# Patient Record
Sex: FEMALE | Race: WHITE
[De-identification: names, ages, dates, MRNs, and addresses within clinical notes are randomized per-mention and may not be internally consistent; named-entity substitution may affect disease eponyms.]

---

## 2022-07-18 ENCOUNTER — HOSPITAL ENCOUNTER (EMERGENCY)
Dept: HOSPITAL 75 - ER FS | Age: 62
Discharge: HOME | End: 2022-07-18
Payer: MEDICAID

## 2022-07-18 VITALS — SYSTOLIC BLOOD PRESSURE: 133 MMHG | DIASTOLIC BLOOD PRESSURE: 81 MMHG

## 2022-07-18 DIAGNOSIS — Z87.448: ICD-10-CM

## 2022-07-18 DIAGNOSIS — Z28.310: ICD-10-CM

## 2022-07-18 DIAGNOSIS — F41.9: Primary | ICD-10-CM

## 2022-07-18 DIAGNOSIS — Z79.2: ICD-10-CM

## 2022-07-18 LAB
ALBUMIN SERPL-MCNC: 4.5 GM/DL (ref 3.2–4.5)
ALP SERPL-CCNC: 67 U/L (ref 40–136)
ALT SERPL-CCNC: 15 U/L (ref 0–55)
APTT BLD: 29 SEC (ref 24–35)
BASOPHILS # BLD AUTO: 0.1 10^3/UL (ref 0–0.1)
BASOPHILS NFR BLD AUTO: 1 % (ref 0–10)
BILIRUB SERPL-MCNC: 0.2 MG/DL (ref 0.1–1)
BUN/CREAT SERPL: 10
CALCIUM SERPL-MCNC: 9.2 MG/DL (ref 8.5–10.1)
CHLORIDE SERPL-SCNC: 104 MMOL/L (ref 98–107)
CO2 SERPL-SCNC: 23 MMOL/L (ref 21–32)
CREAT SERPL-MCNC: 1.06 MG/DL (ref 0.6–1.3)
D DIMER PPP FEU-MCNC: 0.35 UG/ML (ref 0–0.49)
EOSINOPHIL # BLD AUTO: 0.1 10^3/UL (ref 0–0.3)
EOSINOPHIL NFR BLD AUTO: 2 % (ref 0–10)
GFR SERPLBLD BASED ON 1.73 SQ M-ARVRAT: 60 ML/MIN
GLUCOSE SERPL-MCNC: 103 MG/DL (ref 70–105)
HCT VFR BLD CALC: 39 % (ref 35–52)
HGB BLD-MCNC: 13.1 G/DL (ref 11.5–16)
INR PPP: 0.9 (ref 0.8–1.4)
LYMPHOCYTES # BLD AUTO: 4 10^3/UL (ref 1–4)
LYMPHOCYTES NFR BLD AUTO: 53 % (ref 12–44)
MAGNESIUM SERPL-MCNC: 2 MG/DL (ref 1.6–2.4)
MANUAL DIFFERENTIAL PERFORMED BLD QL: NO
MCH RBC QN AUTO: 30 PG (ref 25–34)
MCHC RBC AUTO-ENTMCNC: 34 G/DL (ref 32–36)
MCV RBC AUTO: 88 FL (ref 80–99)
MONOCYTES # BLD AUTO: 0.6 10^3/UL (ref 0–1)
MONOCYTES NFR BLD AUTO: 8 % (ref 0–12)
NEUTROPHILS # BLD AUTO: 2.8 10^3/UL (ref 1.8–7.8)
NEUTROPHILS NFR BLD AUTO: 37 % (ref 42–75)
PLATELET # BLD: 265 10^3/UL (ref 130–400)
PMV BLD AUTO: 10.3 FL (ref 9–12.2)
POTASSIUM SERPL-SCNC: 3.8 MMOL/L (ref 3.6–5)
PROT SERPL-MCNC: 7 GM/DL (ref 6.4–8.2)
PROTHROMBIN TIME: 12.1 SEC (ref 12.2–14.7)
SODIUM SERPL-SCNC: 138 MMOL/L (ref 135–145)
WBC # BLD AUTO: 7.7 10^3/UL (ref 4.3–11)

## 2022-07-18 PROCEDURE — 85730 THROMBOPLASTIN TIME PARTIAL: CPT

## 2022-07-18 PROCEDURE — 85610 PROTHROMBIN TIME: CPT

## 2022-07-18 PROCEDURE — 85025 COMPLETE CBC W/AUTO DIFF WBC: CPT

## 2022-07-18 PROCEDURE — 83605 ASSAY OF LACTIC ACID: CPT

## 2022-07-18 PROCEDURE — 85379 FIBRIN DEGRADATION QUANT: CPT

## 2022-07-18 PROCEDURE — 93005 ELECTROCARDIOGRAM TRACING: CPT

## 2022-07-18 PROCEDURE — 83880 ASSAY OF NATRIURETIC PEPTIDE: CPT

## 2022-07-18 PROCEDURE — 83735 ASSAY OF MAGNESIUM: CPT

## 2022-07-18 PROCEDURE — 80053 COMPREHEN METABOLIC PANEL: CPT

## 2022-07-18 PROCEDURE — 84484 ASSAY OF TROPONIN QUANT: CPT

## 2022-07-18 PROCEDURE — 36415 COLL VENOUS BLD VENIPUNCTURE: CPT

## 2022-07-18 PROCEDURE — 71045 X-RAY EXAM CHEST 1 VIEW: CPT

## 2022-07-18 NOTE — DIAGNOSTIC IMAGING REPORT
INDICATION: Chest pain.



FINDINGS: The lungs are clear although hyperexpanded with

features of air trapping. No failure, effusion or pneumothorax.



IMPRESSION: Clear hyperexpanded lungs, otherwise negative. 



Dictated by: 



  Dictated on workstation # [id number]

## 2022-07-18 NOTE — ED CHEST PAIN
General


Stated Complaint:  SOB,CP





History of Present Illness


Date Seen by Provider:  2022


Time Seen by Provider:  21:43


Initial Comments


62-year-old female with PMH of anxiety, who is currently being treated for 

pyelonephritis on Bactrim, is here with complaints of anxiety and feeling 

stressed, causing her to feel chest pressure.  The symptoms have been going on 

for the past couple of days.  Denies palpitations, fever, diarrhea, abdominal 

pain, headaches, dizziness.





Allergies and Home Medications


Allergies


Coded Allergies:  


     No Known Drug Allergies (Unverified , 22)





Patient Home Medication List


Home Medication List Reviewed:  Yes





Review of Systems


Review of Systems


Constitutional:  no symptoms reported


EENTM:  No Symptoms Reported


Respiratory:  No Symptoms Reported


Cardiovascular:  Chest Pain


Gastrointestinal:  No Symptoms Reported


Genitourinary:  No Symptoms Reported


Musculoskeletal:  no symptoms reported


Skin:  no symptoms reported


Psychiatric/Neurological:  Anxiety


Endocrine:  No Symptoms Reported


Hematologic/Lymphatic:  No Symptoms Reported





Physical Exam


Vital Signs





Vital Signs - First Documented








 22





 21:35


 


Temp 36.6


 


Pulse 69


 


Resp 16


 


B/P (MAP) 137/91 (106)


 


Pulse Ox 99


 


O2 Delivery Room Air





Capillary Refill :


Height, Weight, BMI


Height: '"


Weight: lbs. oz. kg;  BMI


Method:


General Appearance:  No Apparent Distress, Anxious, Thin


HEENT:  PERRL/EOMI


Respiratory:  Chest Non Tender, Lungs Clear, Normal Breath Sounds, No Accessory 

Muscle Use


Cardiovascular:  Regular Rate, Rhythm, No Edema, Normal Peripheral Pulses


Gastrointestinal:  Non Tender, Soft


Neurologic/Psychiatric:  Alert, Oriented x3, No Motor/Sensory Deficits


Skin:  Normal Color


Lymphatic:  No Adenopathy





Focused Exam


Lactate Level


22 22:00: Lactic Acid Level 1.03





Lactic Acid Level





Laboratory Tests








Test


 22


22:00


 


Lactic Acid Level


 1.03 MMOL/L


(0.50-2.00)











Progress/Results/Core Measures


Results/Orders


Lab Results





Laboratory Tests








Test


 22


21:41 22


22:00 22


22:51 Range/Units


 


 


White Blood Count


 7.7 


 


 


 4.3-11.0


10^3/uL


 


Red Blood Count


 4.40 


 


 


 3.80-5.11


10^6/uL


 


Hemoglobin 13.1    11.5-16.0  g/dL


 


Hematocrit 39    35-52  %


 


Mean Corpuscular Volume 88    80-99  fL


 


Mean Corpuscular Hemoglobin 30    25-34  pg


 


Mean Corpuscular Hemoglobin


Concent 34 


 


 


 32-36  g/dL





 


Red Cell Distribution Width 12.9    10.0-14.5  %


 


Platelet Count


 265 


 


 


 130-400


10^3/uL


 


Mean Platelet Volume 10.3    9.0-12.2  fL


 


Immature Granulocyte % (Auto) 0     %


 


Neutrophils (%) (Auto) 37 L   42-75  %


 


Lymphocytes (%) (Auto) 53 H   12-44  %


 


Monocytes (%) (Auto) 8    0-12  %


 


Eosinophils (%) (Auto) 2    0-10  %


 


Basophils (%) (Auto) 1    0-10  %


 


Neutrophils # (Auto)


 2.8 


 


 


 1.8-7.8


10^3/uL


 


Lymphocytes # (Auto)


 4.0 


 


 


 1.0-4.0


10^3/uL


 


Monocytes # (Auto)


 0.6 


 


 


 0.0-1.0


10^3/uL


 


Eosinophils # (Auto)


 0.1 


 


 


 0.0-0.3


10^3/uL


 


Basophils # (Auto)


 0.1 


 


 


 0.0-0.1


10^3/uL


 


Immature Granulocyte # (Auto)


 0.0 


 


 


 0.0-0.1


10^3/uL


 


Prothrombin Time 12.1 L   12.2-14.7  SEC


 


INR Comment 0.9    0.8-1.4  


 


Activated Partial


Thromboplast Time 29 


 


 


 24-35  SEC





 


D-Dimer


 0.35 


 


 


 0.00-0.49


UG/ML


 


Sodium Level 138    135-145  MMOL/L


 


Potassium Level 3.8    3.6-5.0  MMOL/L


 


Chloride Level 104      MMOL/L


 


Carbon Dioxide Level 23    21-32  MMOL/L


 


Anion Gap 11    5-14  MMOL/L


 


Blood Urea Nitrogen 11    7-18  MG/DL


 


Creatinine


 1.06 


 


 


 0.60-1.30


MG/DL


 


Estimat Glomerular Filtration


Rate 60 


 


 


  





 


BUN/Creatinine Ratio 10     


 


Glucose Level 103      MG/DL


 


Calcium Level 9.2    8.5-10.1  MG/DL


 


Corrected Calcium 8.8    8.5-10.1  MG/DL


 


Magnesium Level 2.0    1.6-2.4  MG/DL


 


Total Bilirubin 0.2    0.1-1.0  MG/DL


 


Aspartate Amino Transf


(AST/SGOT) 16 


 


 


 5-34  U/L





 


Alanine Aminotransferase


(ALT/SGPT) 15 


 


 


 0-55  U/L





 


Alkaline Phosphatase 67      U/L


 


Troponin I < 0.30   < 0.30  <0.30  NG/ML


 


Pro-B-Type Natriuretic Peptide 56.6    <125.0  PG/ML


 


Total Protein 7.0    6.4-8.2  GM/DL


 


Albumin 4.5    3.2-4.5  GM/DL


 


Lactic Acid Level


 


 1.03 


 


 0.50-2.00


MMOL/L








My Orders





Orders - LINDSAY CLEMONS MD


Chest 1 View Ap/Pa Only (22 21:44)


Cbc With Automated Diff (22 21:46)


Comprehensive Metabolic Panel (22 21:46)


Fibrin Degradation Products (22:46)


Lactic Acid Analyzer (22:46)


Magnesium (22 21:46)


Protime With Inr (22:46)


Partial Thromboplastin Time (22 21:46)


Ua Culture If Indicated (22:46)


Probnp Fs (22 21:46)


Troponin I Fs (22 21:46)


Aspirin Chewable Tablet (Baby Aspirin Ch (22 22:00)


Troponin I Fs (22 22:44)


Ekg Tracing (22 22:44)





Medications Given in ED





Current Medications








 Medications  Dose


 Ordered  Sig/Niharika


 Route  Start Time


 Stop Time Status Last Admin


Dose Admin


 


 Aspirin  324 mg  ONCE  ONCE


 PO  22 22:00


 22 22:01 DC 22 22:06


324 MG








Vital Signs/I&O











 22





 21:35


 


Temp 36.6


 


Pulse 69


 


Resp 16


 


B/P (MAP) 137/91 (106)


 


Pulse Ox 99


 


O2 Delivery Room Air











Progress


Progress Note :  


Progress Note


1. ACS RULE OUT:


- CXR: unremarkable


- Troponin/ EKG x 2: non-ischemic


- CBC/ CMP/ COaf panel: unremarkable


- ASA 324mg STAT


- Follow up with PCP within 3 to 5 days


-The patient was seen in the ED, and treated appropriately to presentation at a 

specific point in time. Patient is informed that there is a possibility that 

disease and illness can evolve and change in acuity rapidly or slowly after 

patient is discharged from the ER. Precautionary advice given to the patient for

immediate return to ER if symptoms worsen or do not resolve, and to seek 

emergency care sooner rather than later. Pt also advised on the importance of 

PCP follow up and compliance with management and follow up plan with PCP and/or 

specialist, as this is part of the management plan. Pt verbally expressed 

understanding.


Initial ECG Impression Date:  2022


Initial ECG Impression Time:  21:39


Initial ECG Rate:  65


Initial ECG Rhythm:  Normal Sinus


Initial ECG Impression:  Normal


Initial ECG Comparisson:  No Previous ECG Available





Diagnostic Imaging





   Diagonstic Imaging:  Xray


   Plain Films/CT/US/NM/MRI:  chest


Comments


                 [facility], [facility].


                                [place], [place]





NAME:   KASSIE PATEL


MED REC#:   [id number]


ACCOUNT#:   [id number]


PT STATUS:   REG ER


:   1960


PHYSICIAN:   LINDSAY CLEMONS MD


ADMIT DATE:   22/ER FS


                                   ***Draft***


Date of Exam:22





CHEST 1 VIEW AP/PA ONLY








INDICATION: Chest pain.





FINDINGS: The lungs are clear although hyperexpanded with


features of air trapping. No failure, effusion or pneumothorax.





IMPRESSION: Clear hyperexpanded lungs, otherwise negative. 





  Dictated on workstation # [id number]








Dict:   22


Trans:   22


[facility] [id number]





Interpreted by:     JS ORTIZ


Electronically signed by:





Departure


Impression





   Primary Impression:  


   Ruled out for myocardial infarction


   Additional Impression:  


   Anxiety


Disposition:  01 HOME, SELF-CARE


Condition:  Stable





Departure-Patient Inst.


Patient Instructions:  Anxiety, Adult ED





Add. Discharge Instructions:  


- Follow up with PCP within 3 to 5 days


-Return to ER if symptoms worsen or continue











LINDSAY CLEMONS MD              2022 21:45